# Patient Record
Sex: FEMALE | Race: WHITE | NOT HISPANIC OR LATINO | Employment: PART TIME | URBAN - METROPOLITAN AREA
[De-identification: names, ages, dates, MRNs, and addresses within clinical notes are randomized per-mention and may not be internally consistent; named-entity substitution may affect disease eponyms.]

---

## 2018-03-08 ENCOUNTER — HOSPITAL ENCOUNTER (EMERGENCY)
Facility: HOSPITAL | Age: 58
Discharge: HOME | End: 2018-03-08
Attending: EMERGENCY MEDICINE
Payer: MEDICAID

## 2018-03-08 VITALS
HEIGHT: 66 IN | RESPIRATION RATE: 18 BRPM | HEART RATE: 80 BPM | SYSTOLIC BLOOD PRESSURE: 152 MMHG | OXYGEN SATURATION: 96 % | DIASTOLIC BLOOD PRESSURE: 81 MMHG | TEMPERATURE: 98 F | WEIGHT: 133 LBS | BODY MASS INDEX: 21.38 KG/M2

## 2018-03-08 DIAGNOSIS — R07.81 RIB PAIN ON RIGHT SIDE: Primary | ICD-10-CM

## 2018-03-08 DIAGNOSIS — R03.0 ELEVATED BLOOD PRESSURE READING: ICD-10-CM

## 2018-03-08 PROCEDURE — 25000003 PHARM REV CODE 250: Performed by: PHYSICIAN ASSISTANT

## 2018-03-08 PROCEDURE — 99283 EMERGENCY DEPT VISIT LOW MDM: CPT

## 2018-03-08 RX ORDER — LIDOCAINE 50 MG/G
1 PATCH TOPICAL
Status: DISCONTINUED | OUTPATIENT
Start: 2018-03-08 | End: 2018-03-08 | Stop reason: HOSPADM

## 2018-03-08 RX ORDER — CODEINE PHOSPHATE AND GUAIFENESIN 10; 100 MG/5ML; MG/5ML
5 SOLUTION ORAL 3 TIMES DAILY PRN
COMMUNITY

## 2018-03-08 RX ORDER — LIDOCAINE 50 MG/G
1 PATCH TOPICAL DAILY
Qty: 15 PATCH | Refills: 0 | Status: SHIPPED | OUTPATIENT
Start: 2018-03-08 | End: 2020-07-22

## 2018-03-08 RX ORDER — OXYCODONE AND ACETAMINOPHEN 5; 325 MG/1; MG/1
1 TABLET ORAL EVERY 6 HOURS PRN
Qty: 12 TABLET | Refills: 0 | Status: SHIPPED | OUTPATIENT
Start: 2018-03-08 | End: 2020-07-22

## 2018-03-08 RX ORDER — OXYCODONE AND ACETAMINOPHEN 5; 325 MG/1; MG/1
1 TABLET ORAL ONCE
Status: COMPLETED | OUTPATIENT
Start: 2018-03-08 | End: 2018-03-08

## 2018-03-08 RX ORDER — LIDOCAINE 50 MG/G
1 PATCH TOPICAL
Status: DISCONTINUED | OUTPATIENT
Start: 2018-03-08 | End: 2018-03-08

## 2018-03-08 RX ADMIN — LIDOCAINE 1 PATCH: 50 PATCH TOPICAL at 03:03

## 2018-03-08 RX ADMIN — OXYCODONE HYDROCHLORIDE AND ACETAMINOPHEN 1 TABLET: 5; 325 TABLET ORAL at 03:03

## 2018-03-08 NOTE — DISCHARGE INSTRUCTIONS
Percocet for pain.  Lidocaine patch once daily. Continue current care.    Schedule appointment with your primary care physician in 2-3 days for reevaluation and further recommendations.  Be sure to address your elevated blood pressure.    Return to this ED if pain worsens, if you begin with shortness of breath or difficulty with respirations, if you begin with fever, if any other problems occur.

## 2018-03-08 NOTE — ED PROVIDER NOTES
Encounter Date: 3/8/2018       History     Chief Complaint   Patient presents with    Flank Pain     Pain under right breast radiating around to back.  Pain worse with inspiration and expiration.  Recently treated for pneumonia with abx.     57-year-old female with past medical history lumbar radiculopathy presents to ED complaining of right-sided rib pain ×1.5 weeks.  Patient recently diagnosed with pneumonia 9 days ago.  She was treated with antibiotics and steroids.  She admits to near complete resolution of cough, fever, and related respiratory symptoms.  Chief complaint is persistent right-sided rib pain.  She admits to pain with deep inspiration, pain with range of motion of upper extremity.  She denies recent trauma or injury.  She denies history of wrist surgery or lung surgery.  She denies rash.  Symptoms constant.  No alleviating factors.  No radiation of pain.          Review of patient's allergies indicates:   Allergen Reactions    Levaquin [levofloxacin] Rash     Past Medical History:   Diagnosis Date    Lumbar radiculopathy 7/18/2016     History reviewed. No pertinent surgical history.  History reviewed. No pertinent family history.  Social History   Substance Use Topics    Smoking status: Current Every Day Smoker     Types: Cigarettes    Smokeless tobacco: Never Used    Alcohol use No     Review of Systems   Constitutional: Negative for fever.   HENT: Negative for sore throat.    Eyes: Negative.    Respiratory: Negative for shortness of breath.    Cardiovascular: Negative for chest pain.   Gastrointestinal: Negative for nausea.   Endocrine: Negative.    Genitourinary: Negative for dysuria.   Musculoskeletal: Negative for back pain, neck pain and neck stiffness.        Right-sided rib pain   Skin: Negative for rash.   Neurological: Negative for weakness and headaches.   Hematological: Does not bruise/bleed easily.   Psychiatric/Behavioral: Negative.    All other systems reviewed and are  negative.      Physical Exam     Initial Vitals [03/08/18 1445]   BP Pulse Resp Temp SpO2   (!) 172/91 82 18 98.2 °F (36.8 °C) 96 %      MAP       118         Physical Exam    Nursing note and vitals reviewed.  Constitutional: She appears well-developed and well-nourished. She is not diaphoretic. No distress.   HENT:   Head: Normocephalic and atraumatic.   Eyes: Conjunctivae and EOM are normal. Pupils are equal, round, and reactive to light.   Neck: Normal range of motion. Neck supple. No tracheal deviation present.   Cardiovascular: Normal heart sounds and intact distal pulses.   Pulmonary/Chest: Breath sounds normal. No stridor. No respiratory distress. She has no wheezes. She has no rhonchi.         She exhibits tenderness.       TTP right sided middle ribs.  No obvious bony abnormality.  No overlying skin changes.  No vesicular lesions.  Pain to this region with deep inspiration.   Abdominal: Soft. Bowel sounds are normal. She exhibits no distension. There is no tenderness.   Musculoskeletal: Normal range of motion.   Lymphadenopathy:     She has no cervical adenopathy.   Neurological: She is alert and oriented to person, place, and time.   Skin: Skin is warm and dry. Capillary refill takes less than 2 seconds.   Psychiatric: She has a normal mood and affect. Her behavior is normal. Judgment and thought content normal.         ED Course   Procedures  Labs Reviewed - No data to display          Medical Decision Making:   ED Management:  56 yo female, recently diagnosed with pneumonia 9 days ago, presents to ED complaining of persistent right-sided rib pain.  Patient has finished all of her antibiotics and medications.  She admits to resolution of most of her symptoms, however rib pain has persisted.  Differential at this time includes costochondritis, pleurisy, fracture, pneumothorax, pneumonia. Patient overall well-appearing, in no acute distress, afebrile, she is hypertensive. She denies  lightheadedness/dizziness, headache, n/v, or visual disturbance.  She denies history of elevated blood pressure.  I will have patient follow-up with her primary for reevaluation of this blood pressure reading.  I do not feel need to treat pressure at this time.  On exam there is discomfort with palpation to right middle ribs, almost the entire dermatome from the posterolateral region all the way around to the anterior region.  No obvious bony abnormality, no overlying skin changes, no vesicular lesions.  She denies burning sensation.  Low suspicion for zoster at this time.  Chest x-ray without consolidation, effusion, or pneumothorax.  No obvious bony abnormality.  No recent trauma.  Low suspicion for occult fracture.  Pain is sharp and worse with movement and palpation.  She denies chest pain or shortness of breath.  I do think is more musculoskeletal.  Lungs clear bilaterally.  I will discharge with Percocet for pain.  I've asked patient to follow-up with her primary care physician to evaluate her elevated blood pressure, and also for further information concerning her rib pain.  She was understand and agree.  Return precautions given.  Other:   I have discussed this case with another health care provider.       <> Summary of the Discussion: I have discussed this case with Dr. Cardenas.                      Clinical Impression:   The primary encounter diagnosis was Rib pain on right side. A diagnosis of Elevated blood pressure reading was also pertinent to this visit.    Disposition:   Disposition: Discharged  Condition: Stable                        Sam Obrien PA-C  03/08/18 1518

## 2018-03-08 NOTE — ED TRIAGE NOTES
Reports recent Dx pneumonia, flu 9 days ago. Reports reaction to levaquin. . Here today  with C/ o  SOB, rt. Flank and rib  Pain.  Pain when breathing. PCP recommended that patient proceed to ED. Reports fever. Last  Elevated  Temp yesterday.  Takes OTC benadryl and claritin.

## 2018-05-08 ENCOUNTER — HOSPITAL ENCOUNTER (EMERGENCY)
Facility: HOSPITAL | Age: 58
Discharge: HOME | End: 2018-05-08
Attending: EMERGENCY MEDICINE
Payer: MEDICAID

## 2018-05-08 VITALS
HEART RATE: 68 BPM | RESPIRATION RATE: 18 BRPM | HEIGHT: 66 IN | OXYGEN SATURATION: 96 % | TEMPERATURE: 98 F | SYSTOLIC BLOOD PRESSURE: 165 MMHG | WEIGHT: 138 LBS | BODY MASS INDEX: 22.18 KG/M2 | DIASTOLIC BLOOD PRESSURE: 79 MMHG

## 2018-05-08 DIAGNOSIS — R20.2 PARESTHESIA: ICD-10-CM

## 2018-05-08 DIAGNOSIS — M54.16 LUMBAR BACK PAIN WITH RADICULOPATHY AFFECTING RIGHT LOWER EXTREMITY: Primary | ICD-10-CM

## 2018-05-08 DIAGNOSIS — M79.661 RIGHT CALF PAIN: ICD-10-CM

## 2018-05-08 PROCEDURE — 63600175 PHARM REV CODE 636 W HCPCS: Performed by: PHYSICIAN ASSISTANT

## 2018-05-08 PROCEDURE — 96372 THER/PROPH/DIAG INJ SC/IM: CPT

## 2018-05-08 PROCEDURE — 99284 EMERGENCY DEPT VISIT MOD MDM: CPT | Mod: 25

## 2018-05-08 RX ORDER — ETODOLAC 200 MG/1
200 CAPSULE ORAL 3 TIMES DAILY PRN
Qty: 20 CAPSULE | Refills: 0 | Status: SHIPPED | OUTPATIENT
Start: 2018-05-08 | End: 2018-05-15

## 2018-05-08 RX ORDER — BACLOFEN 10 MG/1
10 TABLET ORAL 3 TIMES DAILY
COMMUNITY

## 2018-05-08 RX ORDER — CYCLOBENZAPRINE HCL 10 MG
10 TABLET ORAL 3 TIMES DAILY PRN
Qty: 15 TABLET | Refills: 0 | Status: SHIPPED | OUTPATIENT
Start: 2018-05-08 | End: 2018-05-15

## 2018-05-08 RX ORDER — ORPHENADRINE CITRATE 30 MG/ML
30 INJECTION INTRAMUSCULAR; INTRAVENOUS
Status: COMPLETED | OUTPATIENT
Start: 2018-05-08 | End: 2018-05-08

## 2018-05-08 RX ORDER — ACETAMINOPHEN 500 MG
500 TABLET ORAL EVERY 4 HOURS PRN
Qty: 20 TABLET | Refills: 0 | Status: SHIPPED | OUTPATIENT
Start: 2018-05-08 | End: 2018-05-13

## 2018-05-08 RX ORDER — KETOROLAC TROMETHAMINE 30 MG/ML
15 INJECTION, SOLUTION INTRAMUSCULAR; INTRAVENOUS
Status: COMPLETED | OUTPATIENT
Start: 2018-05-08 | End: 2018-05-08

## 2018-05-08 RX ADMIN — KETOROLAC TROMETHAMINE 15 MG: 30 INJECTION, SOLUTION INTRAMUSCULAR; INTRAVENOUS at 05:05

## 2018-05-08 RX ADMIN — ORPHENADRINE CITRATE 30 MG: 30 INJECTION INTRAMUSCULAR; INTRAVENOUS at 05:05

## 2018-05-08 NOTE — ED PROVIDER NOTES
Encounter Date: 5/8/2018  SCRIBE #1 NOTE: I, Jose Antonio Manning, am scribing for, and in the presence of,  Evelyn Cote PA-C. I have scribed the following portions of the note - Other sections scribed: HPI, ROS.      This is a 57 y.o. female that presents to the ED for lower back pain that radiates down the bilateral legs with associated numbness.   History     Chief Complaint   Patient presents with    Back Pain     x 2 weeks denies trauma but states started yoga 1 month ago     CC: Back Pain    HPI: Thi8s 57 y.o. female with PMHx lumbar radiculopathy, sciatica, pancreatitis, and PSHx nose fx surgery, breast surgery, and tubal ligation presents to the ED c/o severe (8/10) lower back and hip pain for the past 2 weeks. Pt reports shooting pains radiating down her lower extremities. Pt reports numbness, aching, and burning to her bilateral feet which resolved in 2016 and returned 1 month ago. Pt reports a new tingling feeling to her right shin and night sweats. Pt denies fever, chills, nausea, vomiting, rash, incontinence, groin numbness, weakness, and leg swelling. Pt reports taking gabapentin, percocet (last night), ibuprofen, and baclofen as tx at home. Pt says she began doing yoga 1 month ago and has been easing up. Pt reports seeing neurology in 2016, having a nerve ending test, and seeing a spine specialist, and was told she was not a candidate for injections. Pt reports seeing Dr. Barker since her current back pain began. Pt reports hx of a pancreatic cyst. Pt denies hx of DVT and PE. Pt denies drug use.       The history is provided by the patient. No  was used.     Review of patient's allergies indicates:   Allergen Reactions    Levaquin [levofloxacin] Rash     Past Medical History:   Diagnosis Date    Lumbar radiculopathy 7/18/2016    Pancreatitis     Sciatica      Past Surgical History:   Procedure Laterality Date    BREAST SURGERY      FRACTURE SURGERY      nose    TUBAL LIGATION        History reviewed. No pertinent family history.  Social History   Substance Use Topics    Smoking status: Current Every Day Smoker     Packs/day: 0.50     Types: Cigarettes    Smokeless tobacco: Never Used    Alcohol use No     Review of Systems   Constitutional: Positive for diaphoresis. Negative for chills and fever.   HENT: Negative for sore throat.    Respiratory: Negative for shortness of breath.    Cardiovascular: Negative for chest pain and leg swelling.   Gastrointestinal: Negative for nausea and vomiting.   Genitourinary: Negative for dysuria.        (-) incontinence   Musculoskeletal: Positive for back pain.        (+) pain to b/l hips, legs, & feet   Skin: Negative for rash.   Neurological: Positive for numbness (b/l feet). Negative for weakness.        (+) R shin tingling  (-) groin numbness   Hematological: Does not bruise/bleed easily.       Physical Exam     Initial Vitals   BP Pulse Resp Temp SpO2   -- -- -- -- --      MAP       --         Physical Exam    Nursing note and vitals reviewed.  Constitutional: She appears well-developed and well-nourished. No distress.   HENT:   Head: Normocephalic.   Right Ear: External ear normal.   Left Ear: External ear normal.   Eyes: Conjunctivae are normal.   Neck: Normal range of motion.   Cardiovascular: Normal rate and regular rhythm. Exam reveals no gallop and no friction rub.    No murmur heard.  Pulses:       Dorsalis pedis pulses are 2+ on the right side, and 2+ on the left side.   Pulmonary/Chest: Breath sounds normal. No respiratory distress. She has no wheezes. She has no rhonchi. She has no rales.   Abdominal: Soft. Bowel sounds are normal. She exhibits no distension. There is no tenderness. There is no rebound and no guarding.   Musculoskeletal: Normal range of motion.   TTP of lumbar spine at midline and bilateral lumbar paraspinal musculature. Normal strength.     No hip TTP. Pain worse with abduction of bilateral hips.     Pt able to bear  weight and walk with worsening pain with ambulatoin   Neurological: She is alert. A sensory deficit is present. Cranial nerve deficit: 3/5 sensation to RLE. 5/5 sensation to LLE.   Skin: Skin is warm and dry. No rash noted. No erythema.   Psychiatric: She has a normal mood and affect.         ED Course   Procedures  Labs Reviewed - No data to display          Medical Decision Making:   Initial Assessment:   The patient is a 57-year-old female with history of sciatica presents for evaluation of 2 week history of constant lumbar back pain with paresthesias to RLE x 1 month. Pt reports MRI in 2016 for these identical symptoms that resolved after evaluation by Orthopedics, EMG with Neurology , and spine surgery evaluation who stated she was not epidural injection candidate at that time (possibly due to pancreatic cyst for which she is followed by Oncology who discontinued serial biopsies earlier this year)  Review of chart shows that MRI showed  disc disease at multiple levels particularly with right paracentral and medial neural foraminal encroachment at L5-S1 with displacement of the proximal ipsilateral S1 nerve root.  Pt had decreased sensation to RLE at that time as well. Attempted tx percocet ibuprofen baclofen gabapentin.  Patient denies lower bladder incontinence, saddle anesthesia, history of IV drug use.  There is decreased sensation of the right lower extremity as well as right calf tenderness to palpation.  Ultrasound right lower extremity negative for DVT.  2+ pulses bilaterally. Instructed patient bring copy of her MRI and follow-up with Orthopedics, Neurology or spine center for further evaluation and management.  Discontinue ibuprofen and begin Lodine, discontinue baclofen and begin Flexeril, take Norco as previously prescribed for Tylenol as prescribed today.  ER return precautions discussed with worsening symptoms concerns or dyspnea.  Discussed this patient with Dr. Angel who agrees with  assessment and plan.               Scribe Attestation:   Scribe #1: I performed the above scribed service and the documentation accurately describes the services I performed. I attest to the accuracy of the note.    Attending Attestation:           Physician Attestation for Scribe:  Physician Attestation Statement for Scribe #1: I, Evelyn Cote PA-C, reviewed documentation, as scribed by Jose Antonio Manning in my presence, and it is both accurate and complete.                    Clinical Impression:   The primary encounter diagnosis was Lumbar back pain with radiculopathy affecting right lower extremity. Diagnoses of Right calf pain and Paresthesia were also pertinent to this visit.                           Evelyn Cote PA-C  05/08/18 1926

## 2018-05-08 NOTE — DISCHARGE INSTRUCTIONS
Stop taking Ibuprofen and begin taking Lodine as prescribed.   Stop taking Baclofen and begin taking Flexeril as prescribed.   Take EITHER Norco (as you were previously prescribed) or Tylenol as prescribed.     Follow up with primary care, Orthopedics, Spine Surgery and/or Pain Management.     Return to ER for worsening symptoms, new symptoms, bowel or bladder incontinence or saddle anesthesia.

## 2018-05-08 NOTE — ED TRIAGE NOTES
Pt to the ED via personal vehicle with c/o lower back pain x 2 weeks. Pt denies trauma, reports beginning yoga one month ago. Pt reports hx of chronic back pain, however has not had any back problems for 2 years. Pt was diagnosed with sciatica. Pt reports restarting gabapentin due to pain, however no relief. No acute distress noted. Pt also reports numbness to the L shin.

## 2019-06-05 ENCOUNTER — HOSPITAL ENCOUNTER (EMERGENCY)
Facility: HOSPITAL | Age: 59
Discharge: HOME | End: 2019-06-05
Attending: EMERGENCY MEDICINE
Payer: MEDICAID

## 2019-06-05 VITALS
SYSTOLIC BLOOD PRESSURE: 132 MMHG | TEMPERATURE: 98 F | BODY MASS INDEX: 21.97 KG/M2 | HEIGHT: 67 IN | DIASTOLIC BLOOD PRESSURE: 77 MMHG | OXYGEN SATURATION: 99 % | HEART RATE: 61 BPM | RESPIRATION RATE: 18 BRPM | WEIGHT: 140 LBS

## 2019-06-05 DIAGNOSIS — M25.571 ACUTE RIGHT ANKLE PAIN: ICD-10-CM

## 2019-06-05 DIAGNOSIS — S39.012A STRAIN OF LUMBAR REGION, INITIAL ENCOUNTER: Primary | ICD-10-CM

## 2019-06-05 DIAGNOSIS — Z87.39 HISTORY OF BACK PAIN: ICD-10-CM

## 2019-06-05 LAB
BILIRUB UR QL STRIP: NEGATIVE
CLARITY UR: CLEAR
COLOR UR: YELLOW
GLUCOSE UR QL STRIP: NEGATIVE
HGB UR QL STRIP: NEGATIVE
KETONES UR QL STRIP: NEGATIVE
LEUKOCYTE ESTERASE UR QL STRIP: ABNORMAL
MICROSCOPIC COMMENT: NORMAL
NITRITE UR QL STRIP: NEGATIVE
PH UR STRIP: 5 [PH] (ref 5–8)
PROT UR QL STRIP: NEGATIVE
SP GR UR STRIP: 1.01 (ref 1–1.03)
SQUAMOUS #/AREA URNS HPF: 2 /HPF
URN SPEC COLLECT METH UR: ABNORMAL
UROBILINOGEN UR STRIP-ACNC: NEGATIVE EU/DL
WBC #/AREA URNS HPF: 3 /HPF (ref 0–5)

## 2019-06-05 PROCEDURE — 99284 EMERGENCY DEPT VISIT MOD MDM: CPT

## 2019-06-05 PROCEDURE — 81000 URINALYSIS NONAUTO W/SCOPE: CPT

## 2019-06-05 RX ORDER — MELOXICAM 7.5 MG/1
7.5 TABLET ORAL DAILY
Qty: 12 TABLET | Refills: 0 | Status: SHIPPED | OUTPATIENT
Start: 2019-06-05

## 2019-06-05 RX ORDER — LIDOCAINE 50 MG/G
1 PATCH TOPICAL DAILY
Qty: 6 PATCH | Refills: 0 | Status: SHIPPED | OUTPATIENT
Start: 2019-06-05 | End: 2020-07-22

## 2019-06-05 RX ORDER — ORPHENADRINE CITRATE 100 MG/1
100 TABLET, EXTENDED RELEASE ORAL 2 TIMES DAILY
Qty: 8 TABLET | Refills: 0 | Status: SHIPPED | OUTPATIENT
Start: 2019-06-05 | End: 2019-06-09

## 2019-06-06 NOTE — ED PROVIDER NOTES
Encounter Date: 6/5/2019    SCRIBE #1 NOTE: I, Pb Chow, am scribing for, and in the presence of,  Clinton Barragan PA-C. I have scribed the following portions of the note - Other sections scribed: HPI, ROS.       History     Chief Complaint   Patient presents with    Flank Pain     Leftside flank pain x 4 weeks; Pt denies urinary symptoms; Pt also c/o right foot pain and swelling x 1 week;     CC: Flank Pain    HPI: This 58 y.o female with medical h/o lumbar radiculopathy, sciatica, pancreatitis presents to the ED for an emergent evaluation of L flank pain for the last 3 weeks. Pt's flank pain is worsened by leaning/laying on L side body and other general pressure to the located area. She denies any dysuria, hematuria, or increased frequency. No fever, cough, rhinorrhea, CP, SOB, N/V, or HA. She attempted treatment by taking Aleve with improvement which was last taken at 1830 this evening. Pt also c/o acute R foot swelling that began 1 week ago when she noticed her foot was tight while her shoe was on. She denies any recent falls or trauma. She denies any foot pain though she mentions having chronic bilateral feet numbness due to lumbar surgery in the past. She is still ambulatory and denies pain exacerbation by it. She had eaten boiled shrimp and crabs a few days ago and has been sleeping while keeping her legs elevated. She denies h/o CHF, DVT, gout, kidney failure, liver problems, or osteoarthritis. Currently postmenopausal. Pt works as an  and is on her feet throughout entire shifts.    Pt's medical records held lumbar spine Xray in 2016 that showed degenerative changes.      The history is provided by the patient and medical records. No  was used.     Review of patient's allergies indicates:   Allergen Reactions    Levaquin [levofloxacin] Rash     Past Medical History:   Diagnosis Date    Lumbar radiculopathy 7/18/2016    Pancreatitis     Sciatica       Past Surgical History:   Procedure Laterality Date    BREAST SURGERY      FRACTURE SURGERY      nose    TUBAL LIGATION       No family history on file.  Social History     Tobacco Use    Smoking status: Former Smoker     Packs/day: 0.50     Types: Cigarettes    Smokeless tobacco: Never Used   Substance Use Topics    Alcohol use: No    Drug use: No     Review of Systems   Constitutional: Negative for chills and fever.   HENT: Negative for congestion, ear pain, rhinorrhea and sore throat.    Eyes: Negative for pain and visual disturbance.   Respiratory: Negative for cough and shortness of breath.    Cardiovascular: Negative for chest pain.   Gastrointestinal: Negative for abdominal pain, diarrhea, nausea and vomiting.   Genitourinary: Positive for flank pain. Negative for dysuria, frequency and hematuria.   Musculoskeletal: Negative for back pain and neck pain.   Skin: Negative for rash.   Neurological: Positive for numbness (chronic). Negative for headaches.   All other systems reviewed and are negative.      Physical Exam     Initial Vitals [06/05/19 2008]   BP Pulse Resp Temp SpO2   (!) 144/75 65 18 98.4 °F (36.9 °C) 97 %      MAP       --         Physical Exam    Nursing note and vitals reviewed.  Constitutional: She appears well-developed and well-nourished. She is not diaphoretic. No distress.   HENT:   Head: Normocephalic and atraumatic.   Nose: Nose normal.   Eyes: Conjunctivae and EOM are normal. Right eye exhibits no discharge. Left eye exhibits no discharge.   Neck: Normal range of motion. No tracheal deviation present. No JVD present.   Cardiovascular: Normal rate, regular rhythm and normal heart sounds. Exam reveals no friction rub.    No murmur heard.  Pulmonary/Chest: Breath sounds normal. No stridor. No respiratory distress. She has no wheezes. She has no rhonchi. She has no rales. She exhibits no tenderness.   Abdominal: Soft. She exhibits no distension. There is no tenderness. There is no  rigidity, no rebound, no guarding, no CVA tenderness, no tenderness at McBurney's point and negative Argueta's sign.   Musculoskeletal: Normal range of motion. She exhibits no edema or tenderness.   Reproducible tenderness of the left lumbar musculature without midline tenderness to the spine or bony tenderness of the hips.  No overlying skin abnormalities, including for erythema or vesicular lesions. There is no tenderness, swelling, or erythema of the bilateral ankles.  Distal pulses 2+ and equal. Sensation intact and equal.  Ambulatory without limp or pain.   Neurological: She is alert and oriented to person, place, and time.   Skin: Skin is warm and dry. No rash and no abscess noted. No erythema. No pallor.         ED Course   Procedures  Labs Reviewed   URINALYSIS, REFLEX TO URINE CULTURE - Abnormal; Notable for the following components:       Result Value    Leukocytes, UA Trace (*)     All other components within normal limits    Narrative:     Preferred Collection Type->Urine, Clean Catch   URINALYSIS MICROSCOPIC    Narrative:     Preferred Collection Type->Urine, Clean Catch          Imaging Results    None          Medical Decision Making:   History:   Old Medical Records: I decided to obtain old medical records.  Initial Assessment:   58-year-old female with multiple complaints  Clinical Tests:   Lab Tests: Ordered and Reviewed  ED Management:  Urinalysis shows no convincing evidence for UTI or ureteral stone.  No cellulitis or herpes zoster.  No history of trauma to suggest acute fracture.  No bony lesion on x-ray of lumbar spine from outside facility on 2016.  No abdominal involvement today.  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  No discernible swelling to lower extremities.  No signs of DVT, septic joint, or vascular compromise.  Less suspicious for gout.  Patient is less concerned about her ft today is the swelling has dissipated on its own.  Could be dependent  edema which she states she already wears compression stockings for, but has not recently for the duration of the symptoms.    Sent home with supportive care. Advising PCP follow up. Strict return precautions discussed. Agreeable to plan.               Scribe Attestation:   Scribe #1: I performed the above scribed service and the documentation accurately describes the services I performed. I attest to the accuracy of the note.    Attending Attestation:           Physician Attestation for Scribe:  Physician Attestation Statement for Scribe #1: I, Clinton Barragan PA-C, reviewed documentation, as scribed by Pb Chow in my presence, and it is both accurate and complete.                    Clinical Impression:       ICD-10-CM ICD-9-CM   1. Strain of lumbar region, initial encounter S39.012A 847.2   2. History of back pain Z87.39 V13.59   3. Acute right ankle pain M25.571 719.47     338.19                                Clinton Barragan PA-C  06/05/19 2219

## 2019-06-06 NOTE — MEDICAL/APP STUDENT
History     Chief Complaint   Patient presents with    Flank Pain     Leftside flank pain x 4 weeks; Pt denies urinary symptoms; Pt also c/o right foot pain and swelling x 1 week;     This is a 59 yo F post menopausal with no PMHx presenting to the ED c/o constant throbbing 8/10 L flank pain x2-3 weeks. Pain is worse when riding in a car and better with naproxen. She also c/o of R ankle swelling for one which which is worse with walking and also better with naproxen. Pain does not radiate and she denies any f/c/n/v/d, abd pain, dysuria, vaginal d/c, dizziness, numbness, HA, SOB, CP, or any other sx at this time.          Past Medical History:   Diagnosis Date    Lumbar radiculopathy 7/18/2016    Pancreatitis     Sciatica        Past Surgical History:   Procedure Laterality Date    BREAST SURGERY      FRACTURE SURGERY      nose    TUBAL LIGATION         No family history on file.    Social History     Tobacco Use    Smoking status: Former Smoker     Packs/day: 0.50     Types: Cigarettes    Smokeless tobacco: Never Used   Substance Use Topics    Alcohol use: No    Drug use: No       Review of Systems   Constitutional: Negative for chills and fever.   HENT: Negative for sore throat and trouble swallowing.    Eyes: Negative for pain and redness.   Respiratory: Negative for cough, chest tightness and shortness of breath.    Cardiovascular: Negative for chest pain and leg swelling.   Gastrointestinal: Negative for abdominal pain, diarrhea, nausea and vomiting.   Genitourinary: Positive for flank pain (Left). Negative for dysuria and hematuria.   Musculoskeletal: Positive for joint swelling. Negative for back pain, neck pain and neck stiffness. Gait problem: R ankle.   Neurological: Negative for dizziness, syncope, speech difficulty, weakness, light-headedness, numbness and headaches.       Physical Exam   BP (!) 144/75 (BP Location: Right arm, Patient Position: Sitting)   Pulse 65   Temp 98.4 °F (36.9 °C)  "(Oral)   Resp 18   Ht 5' 6.5" (1.689 m)   Wt 63.5 kg (140 lb)   SpO2 97%   Breastfeeding? No   BMI 22.26 kg/m²     Physical Exam    Vitals reviewed.  Constitutional: She appears well-developed and well-nourished.   HENT:   Head: Normocephalic and atraumatic.   Eyes: Conjunctivae and EOM are normal. Pupils are equal, round, and reactive to light.   Neck: Normal range of motion. Neck supple.   Cardiovascular: Normal rate, regular rhythm and normal heart sounds.   Pulmonary/Chest: Breath sounds normal. No respiratory distress.   Abdominal: Soft. Bowel sounds are normal. She exhibits no mass. There is no tenderness. There is no rebound, no guarding and no CVA tenderness.   Musculoskeletal:        Right ankle: She exhibits normal range of motion, no swelling, no ecchymosis and no deformity. No tenderness. Achilles tendon exhibits abnormal Carranza's test results. Achilles tendon exhibits no pain.        Left ankle: Normal.   Neurological: She is alert and oriented to person, place, and time.   Skin: Skin is warm and dry. Capillary refill takes less than 2 seconds. No rash noted.         ED Course         "

## 2020-07-17 ENCOUNTER — HOSPITAL ENCOUNTER (EMERGENCY)
Facility: HOSPITAL | Age: 60
Discharge: HOME | End: 2020-07-17
Attending: EMERGENCY MEDICINE
Payer: MEDICAID

## 2020-07-17 VITALS
TEMPERATURE: 98 F | OXYGEN SATURATION: 98 % | DIASTOLIC BLOOD PRESSURE: 95 MMHG | WEIGHT: 145 LBS | HEIGHT: 66 IN | SYSTOLIC BLOOD PRESSURE: 185 MMHG | RESPIRATION RATE: 18 BRPM | BODY MASS INDEX: 23.3 KG/M2 | HEART RATE: 61 BPM

## 2020-07-17 DIAGNOSIS — T25.029A: Primary | ICD-10-CM

## 2020-07-17 LAB
ANION GAP SERPL CALC-SCNC: 10 MMOL/L (ref 8–16)
BASOPHILS # BLD AUTO: 0.04 K/UL (ref 0–0.2)
BASOPHILS NFR BLD: 0.6 % (ref 0–1.9)
BUN SERPL-MCNC: 16 MG/DL (ref 6–20)
CALCIUM SERPL-MCNC: 10.1 MG/DL (ref 8.7–10.5)
CHLORIDE SERPL-SCNC: 105 MMOL/L (ref 95–110)
CO2 SERPL-SCNC: 26 MMOL/L (ref 23–29)
CREAT SERPL-MCNC: 1 MG/DL (ref 0.5–1.4)
DIFFERENTIAL METHOD: NORMAL
EOSINOPHIL # BLD AUTO: 0.1 K/UL (ref 0–0.5)
EOSINOPHIL NFR BLD: 1.2 % (ref 0–8)
ERYTHROCYTE [DISTWIDTH] IN BLOOD BY AUTOMATED COUNT: 12.9 % (ref 11.5–14.5)
EST. GFR  (AFRICAN AMERICAN): >60 ML/MIN/1.73 M^2
EST. GFR  (NON AFRICAN AMERICAN): >60 ML/MIN/1.73 M^2
GLUCOSE SERPL-MCNC: 92 MG/DL (ref 70–110)
HCT VFR BLD AUTO: 46.3 % (ref 37–48.5)
HGB BLD-MCNC: 14.8 G/DL (ref 12–16)
IMM GRANULOCYTES # BLD AUTO: 0.01 K/UL (ref 0–0.04)
IMM GRANULOCYTES NFR BLD AUTO: 0.2 % (ref 0–0.5)
LYMPHOCYTES # BLD AUTO: 2.5 K/UL (ref 1–4.8)
LYMPHOCYTES NFR BLD: 38.1 % (ref 18–48)
MCH RBC QN AUTO: 29.2 PG (ref 27–31)
MCHC RBC AUTO-ENTMCNC: 32 G/DL (ref 32–36)
MCV RBC AUTO: 92 FL (ref 82–98)
MONOCYTES # BLD AUTO: 0.6 K/UL (ref 0.3–1)
MONOCYTES NFR BLD: 9.1 % (ref 4–15)
NEUTROPHILS # BLD AUTO: 3.4 K/UL (ref 1.8–7.7)
NEUTROPHILS NFR BLD: 50.8 % (ref 38–73)
NRBC BLD-RTO: 0 /100 WBC
PLATELET # BLD AUTO: 204 K/UL (ref 150–350)
PMV BLD AUTO: 9.6 FL (ref 9.2–12.9)
POTASSIUM SERPL-SCNC: 4.9 MMOL/L (ref 3.5–5.1)
RBC # BLD AUTO: 5.06 M/UL (ref 4–5.4)
SODIUM SERPL-SCNC: 141 MMOL/L (ref 136–145)
WBC # BLD AUTO: 6.61 K/UL (ref 3.9–12.7)

## 2020-07-17 PROCEDURE — 99284 EMERGENCY DEPT VISIT MOD MDM: CPT | Mod: 25

## 2020-07-17 PROCEDURE — 80048 BASIC METABOLIC PNL TOTAL CA: CPT

## 2020-07-17 PROCEDURE — 85025 COMPLETE CBC W/AUTO DIFF WBC: CPT

## 2020-07-17 RX ORDER — LEVOTHYROXINE SODIUM 50 UG/1
50 TABLET ORAL
COMMUNITY

## 2020-07-17 RX ORDER — CLINDAMYCIN HYDROCHLORIDE 300 MG/1
300 CAPSULE ORAL EVERY 6 HOURS
COMMUNITY

## 2020-07-17 RX ORDER — DOXYCYCLINE HYCLATE 100 MG
100 TABLET ORAL 2 TIMES DAILY
COMMUNITY
End: 2020-07-22

## 2020-07-17 RX ORDER — PANTOPRAZOLE SODIUM 20 MG/1
20 TABLET, DELAYED RELEASE ORAL DAILY
Qty: 30 TABLET | Refills: 0 | Status: SHIPPED | OUTPATIENT
Start: 2020-07-17 | End: 2020-07-22

## 2020-07-17 RX ORDER — ONDANSETRON 4 MG/1
4 TABLET, ORALLY DISINTEGRATING ORAL EVERY 6 HOURS PRN
Qty: 20 TABLET | Refills: 0 | Status: SHIPPED | OUTPATIENT
Start: 2020-07-17
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
## 2020-07-18 NOTE — DISCHARGE INSTRUCTIONS
Continue taking doxycycline.  Continue current wound care.  Try not to walk or put too much pressure on the wound.  Elevate the foot when you are at rest.    Please return if unable to treat a fever, if unable to tolerate pain, if wound becomes red and warm, if wound begins to drain foul-smelling fluid, if any other problems occur.
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
## 2020-07-22 ENCOUNTER — HOSPITAL ENCOUNTER (OUTPATIENT)
Dept: WOUND CARE | Facility: HOSPITAL | Age: 60
Discharge: HOME | End: 2020-07-22
Attending: FAMILY MEDICINE
Payer: MEDICAID

## 2020-07-22 VITALS
WEIGHT: 147 LBS | BODY MASS INDEX: 23.63 KG/M2 | HEIGHT: 66 IN | HEART RATE: 65 BPM | TEMPERATURE: 97 F | DIASTOLIC BLOOD PRESSURE: 81 MMHG | SYSTOLIC BLOOD PRESSURE: 134 MMHG

## 2020-07-22 DIAGNOSIS — T25.029A: ICD-10-CM

## 2020-07-22 PROCEDURE — 99214 PR OFFICE/OUTPT VISIT, EST, LEVL IV, 30-39 MIN: ICD-10-PCS | Mod: ,,, | Performed by: FAMILY MEDICINE

## 2020-07-22 PROCEDURE — 99213 OFFICE O/P EST LOW 20 MIN: CPT | Performed by: FAMILY MEDICINE

## 2020-07-22 PROCEDURE — 99214 OFFICE O/P EST MOD 30 MIN: CPT | Mod: ,,, | Performed by: FAMILY MEDICINE
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
## 2020-07-29 ENCOUNTER — HOSPITAL ENCOUNTER (OUTPATIENT)
Dept: WOUND CARE | Facility: HOSPITAL | Age: 60
Discharge: HOME | End: 2020-07-29
Attending: FAMILY MEDICINE
Payer: MEDICAID

## 2020-07-29 VITALS
DIASTOLIC BLOOD PRESSURE: 74 MMHG | TEMPERATURE: 98 F | HEART RATE: 67 BPM | SYSTOLIC BLOOD PRESSURE: 138 MMHG | RESPIRATION RATE: 20 BRPM

## 2020-07-29 DIAGNOSIS — T25.029A: Primary | ICD-10-CM

## 2020-07-29 PROCEDURE — 99214 OFFICE O/P EST MOD 30 MIN: CPT | Mod: ,,, | Performed by: FAMILY MEDICINE

## 2020-07-29 PROCEDURE — 99214 PR OFFICE/OUTPT VISIT, EST, LEVL IV, 30-39 MIN: ICD-10-PCS | Mod: ,,, | Performed by: FAMILY MEDICINE

## 2020-07-29 PROCEDURE — 99213 OFFICE O/P EST LOW 20 MIN: CPT | Performed by: FAMILY MEDICINE

## 2020-08-05 ENCOUNTER — HOSPITAL ENCOUNTER (OUTPATIENT)
Dept: WOUND CARE | Facility: HOSPITAL | Age: 60
Discharge: HOME | End: 2020-08-05
Attending: FAMILY MEDICINE
Payer: MEDICAID

## 2020-08-05 VITALS — DIASTOLIC BLOOD PRESSURE: 81 MMHG | TEMPERATURE: 97 F | HEART RATE: 63 BPM | SYSTOLIC BLOOD PRESSURE: 118 MMHG

## 2020-08-05 DIAGNOSIS — T25.221D PARTIAL THICKNESS BURN OF RIGHT FOOT, SUBSEQUENT ENCOUNTER: Primary | ICD-10-CM

## 2020-08-05 PROCEDURE — 99213 PR OFFICE/OUTPT VISIT, EST, LEVL III, 20-29 MIN: ICD-10-PCS | Mod: ,,, | Performed by: FAMILY MEDICINE

## 2020-08-05 PROCEDURE — 99213 OFFICE O/P EST LOW 20 MIN: CPT | Mod: ,,, | Performed by: FAMILY MEDICINE

## 2020-08-05 PROCEDURE — 99212 OFFICE O/P EST SF 10 MIN: CPT | Performed by: FAMILY MEDICINE
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
## 2020-10-02 ENCOUNTER — TELEPHONE (OUTPATIENT)
Dept: ORTHOPEDICS | Facility: CLINIC | Age: 60
End: 2020-10-02

## 2020-10-02 DIAGNOSIS — M25.552 BILATERAL HIP PAIN: Primary | ICD-10-CM

## 2020-10-02 DIAGNOSIS — M25.551 BILATERAL HIP PAIN: Primary | ICD-10-CM

## 2020-10-06 ENCOUNTER — OFFICE VISIT (OUTPATIENT)
Dept: ORTHOPEDICS | Facility: CLINIC | Age: 60
End: 2020-10-06
Payer: MEDICAID

## 2020-10-06 DIAGNOSIS — M51.36 LUMBAR DEGENERATIVE DISC DISEASE: Primary | ICD-10-CM

## 2020-10-06 DIAGNOSIS — M25.551 BILATERAL HIP PAIN: ICD-10-CM

## 2020-10-06 DIAGNOSIS — M25.552 BILATERAL HIP PAIN: ICD-10-CM

## 2020-10-06 PROCEDURE — 99999 PR PBB SHADOW E&M-EST. PATIENT-LVL III: CPT | Mod: PBBFAC,,, | Performed by: ORTHOPAEDIC SURGERY

## 2020-10-06 PROCEDURE — 99999 PR PBB SHADOW E&M-EST. PATIENT-LVL III: ICD-10-PCS | Mod: PBBFAC,,, | Performed by: ORTHOPAEDIC SURGERY

## 2020-10-06 PROCEDURE — 99202 OFFICE O/P NEW SF 15 MIN: CPT | Mod: S$PBB,,, | Performed by: ORTHOPAEDIC SURGERY

## 2020-10-06 PROCEDURE — 99202 PR OFFICE/OUTPT VISIT, NEW, LEVL II, 15-29 MIN: ICD-10-PCS | Mod: S$PBB,,, | Performed by: ORTHOPAEDIC SURGERY

## 2020-10-06 PROCEDURE — 99213 OFFICE O/P EST LOW 20 MIN: CPT | Mod: PBBFAC,PN | Performed by: ORTHOPAEDIC SURGERY

## 2020-10-06 RX ORDER — FLUTICASONE PROPIONATE 50 MCG
SPRAY, SUSPENSION (ML) NASAL
COMMUNITY

## 2020-10-06 RX ORDER — ERGOCALCIFEROL 1.25 MG/1
50000 CAPSULE ORAL
COMMUNITY
Start: 2020-09-14

## 2020-10-06 RX ORDER — ALENDRONATE SODIUM 70 MG/1
TABLET ORAL
COMMUNITY
Start: 2020-09-14

## 2020-10-06 NOTE — PROGRESS NOTES
Subjective:      Patient ID: Lillian De is a 59 y.o. female.    Chief Complaint: Consult and Hip Pain (bilatarel - left more severe )    HPI      Lillian De is seen for evaluation and treatment of hip pain.  They have experienced problems with their left hip over the past 2 years Pain is located In the lower back refer down the left lower extremity.  The patient denies focal neurologic complaints.  She reports the pain is severe and completely unrelated to any activity that she performs.. They have been treated with over the counter analgesics and NSAIDS.   Symptoms have recently stayed the same. Ambulation reportedly has been impaired. Self care ADLs are not painful.     Review of Systems   Musculoskeletal: Positive for back pain and joint pain.         Objective:            Ortho/SPM Exam    Left hip    The patient is not in acute distress.   Body habitus is:normal.   Sclerae normal  The patient walks without a limp.   Respiratory distress:  none  The skin over the hip is:intact.   There is no local tenderness.   Range of motion- Flexion full, External rotation full, internal rotation full.  Resisted SLR negative.  Pain with rotation negative  Sciatic tension findings mild to moderately positive  Shortening/lengthening compared to the contralateral side absent.  Pulses DP present, PT present.  Motor normal 5/5 strength in all tested muscle groups.   Sensory normal.      I reviewed the relevant imaging for the patient's condition:  Radiographs of both hips are normal for the patient's age            Assessment:       Encounter Diagnoses   Name Primary?    Lumbar degenerative disc disease Yes    Bilateral hip pain           The patient's presentation strongly suggests chronic lumbar degenerative disc disease with possible nerve root impingement.    There is no objective evidence of any intrinsic hip pathology.  Plan:       Lillian was seen today for consult and hip pain.    Diagnoses and all orders  for this visit:    Lumbar degenerative disc disease    Bilateral hip pain        I explained my diagnostic impression and the reasoning behind it in detail, using layman's terms.  Models and/or pictures were used to help in the explanation.    Given the prolonged nature of the patient's symptoms, I recommend that she seek the care of a spine specialist.  Unfortunately, there is not this facility.  I have advised her to contact her primary physician for further direction in this regard.

## 2020-10-06 NOTE — LETTER
October 6, 2020      Michele Nieto MD  [address] [place] [address]           [facility]  [address] , [facility] [address]  [place] [address]  Phone: [phone]  Fax: [phone]          Patient: Lillian De   MR Number: [id number]   YOB: 1960   Date of Visit: 10/6/2020       Dear Dr. Michele Nieto:    Thank you for referring Lillian De to me for evaluation. Attached you will find relevant portions of my assessment and plan of care.    If you have questions, please do not hesitate to call me. I look forward to following Lillian De along with you.    Sincerely,    Vinnie Nayak MD    Enclosure  CC:  No Recipients    If you would like to receive this communication electronically, please contact [email or web address] or [phone] to request more information on [facility] Link access.    For providers and/or their staff who would like to refer a patient to [facility], please contact us through our one-stop-shop provider referral line, [facility], at [phone].    If you feel you have received this communication in error or would no longer like to receive these types of communications, please e-mail [email or web address]

## 2021-03-07 ENCOUNTER — CLINICAL SUPPORT (OUTPATIENT)
Dept: URGENT CARE | Facility: CLINIC | Age: 61
End: 2021-03-07
Payer: MEDICAID

## 2021-03-07 DIAGNOSIS — Z11.9 ENCOUNTER FOR SCREENING EXAMINATION FOR INFECTIOUS DISEASE: Primary | ICD-10-CM

## 2021-03-07 LAB
CTP QC/QA: YES
SARS-COV-2 RDRP RESP QL NAA+PROBE: NEGATIVE

## 2021-03-07 PROCEDURE — U0002 COVID-19 LAB TEST NON-CDC: HCPCS | Mod: QW,S$GLB,, | Performed by: PHYSICIAN ASSISTANT

## 2021-03-07 PROCEDURE — U0002: ICD-10-PCS | Mod: QW,S$GLB,, | Performed by: PHYSICIAN ASSISTANT
